# Patient Record
Sex: MALE | Race: WHITE | URBAN - METROPOLITAN AREA
[De-identification: names, ages, dates, MRNs, and addresses within clinical notes are randomized per-mention and may not be internally consistent; named-entity substitution may affect disease eponyms.]

---

## 2017-08-17 ENCOUNTER — APPOINTMENT (OUTPATIENT)
Dept: PHYSICAL THERAPY | Facility: CLINIC | Age: 67
End: 2017-08-17
Payer: COMMERCIAL

## 2017-08-17 PROCEDURE — 97161 PT EVAL LOW COMPLEX 20 MIN: CPT

## 2017-08-17 PROCEDURE — 97140 MANUAL THERAPY 1/> REGIONS: CPT

## 2017-08-21 ENCOUNTER — APPOINTMENT (OUTPATIENT)
Dept: PHYSICAL THERAPY | Facility: CLINIC | Age: 67
End: 2017-08-21
Payer: COMMERCIAL

## 2017-08-21 PROCEDURE — 97140 MANUAL THERAPY 1/> REGIONS: CPT

## 2017-08-21 PROCEDURE — 97010 HOT OR COLD PACKS THERAPY: CPT

## 2017-08-21 PROCEDURE — 97110 THERAPEUTIC EXERCISES: CPT

## 2017-08-22 ENCOUNTER — APPOINTMENT (OUTPATIENT)
Dept: PHYSICAL THERAPY | Facility: CLINIC | Age: 67
End: 2017-08-22
Payer: COMMERCIAL

## 2017-08-22 PROCEDURE — 97110 THERAPEUTIC EXERCISES: CPT

## 2017-08-22 PROCEDURE — 97140 MANUAL THERAPY 1/> REGIONS: CPT

## 2017-08-24 ENCOUNTER — APPOINTMENT (OUTPATIENT)
Dept: PHYSICAL THERAPY | Facility: CLINIC | Age: 67
End: 2017-08-24
Payer: COMMERCIAL

## 2017-08-24 PROCEDURE — 97010 HOT OR COLD PACKS THERAPY: CPT

## 2017-08-24 PROCEDURE — 97140 MANUAL THERAPY 1/> REGIONS: CPT

## 2017-08-24 PROCEDURE — 97110 THERAPEUTIC EXERCISES: CPT

## 2017-08-28 ENCOUNTER — APPOINTMENT (OUTPATIENT)
Dept: PHYSICAL THERAPY | Facility: CLINIC | Age: 67
End: 2017-08-28
Payer: COMMERCIAL

## 2017-08-28 PROCEDURE — 97110 THERAPEUTIC EXERCISES: CPT

## 2017-08-28 PROCEDURE — 97010 HOT OR COLD PACKS THERAPY: CPT

## 2017-08-28 PROCEDURE — 97140 MANUAL THERAPY 1/> REGIONS: CPT

## 2017-08-29 ENCOUNTER — APPOINTMENT (OUTPATIENT)
Dept: PHYSICAL THERAPY | Facility: CLINIC | Age: 67
End: 2017-08-29
Payer: COMMERCIAL

## 2017-08-29 PROCEDURE — 97110 THERAPEUTIC EXERCISES: CPT

## 2017-08-29 PROCEDURE — 97140 MANUAL THERAPY 1/> REGIONS: CPT

## 2017-08-29 PROCEDURE — 97010 HOT OR COLD PACKS THERAPY: CPT

## 2017-08-31 ENCOUNTER — APPOINTMENT (OUTPATIENT)
Dept: PHYSICAL THERAPY | Facility: CLINIC | Age: 67
End: 2017-08-31
Payer: COMMERCIAL

## 2017-08-31 PROCEDURE — 97140 MANUAL THERAPY 1/> REGIONS: CPT

## 2017-08-31 PROCEDURE — 97110 THERAPEUTIC EXERCISES: CPT

## 2017-09-07 ENCOUNTER — APPOINTMENT (OUTPATIENT)
Dept: PHYSICAL THERAPY | Facility: CLINIC | Age: 67
End: 2017-09-07
Payer: COMMERCIAL

## 2017-09-07 PROCEDURE — 97110 THERAPEUTIC EXERCISES: CPT

## 2017-09-07 PROCEDURE — 97140 MANUAL THERAPY 1/> REGIONS: CPT

## 2017-09-11 ENCOUNTER — APPOINTMENT (OUTPATIENT)
Dept: PHYSICAL THERAPY | Facility: CLINIC | Age: 67
End: 2017-09-11
Payer: COMMERCIAL

## 2017-09-11 PROCEDURE — 97110 THERAPEUTIC EXERCISES: CPT

## 2017-09-11 PROCEDURE — 97140 MANUAL THERAPY 1/> REGIONS: CPT

## 2017-09-14 ENCOUNTER — APPOINTMENT (OUTPATIENT)
Dept: PHYSICAL THERAPY | Facility: CLINIC | Age: 67
End: 2017-09-14
Payer: COMMERCIAL

## 2017-09-14 PROCEDURE — 97140 MANUAL THERAPY 1/> REGIONS: CPT

## 2017-09-14 PROCEDURE — 97110 THERAPEUTIC EXERCISES: CPT

## 2017-09-18 ENCOUNTER — APPOINTMENT (OUTPATIENT)
Dept: PHYSICAL THERAPY | Facility: CLINIC | Age: 67
End: 2017-09-18
Payer: COMMERCIAL

## 2017-09-18 PROCEDURE — 97110 THERAPEUTIC EXERCISES: CPT

## 2017-09-18 PROCEDURE — 97140 MANUAL THERAPY 1/> REGIONS: CPT

## 2017-09-21 ENCOUNTER — APPOINTMENT (OUTPATIENT)
Dept: PHYSICAL THERAPY | Facility: CLINIC | Age: 67
End: 2017-09-21
Payer: COMMERCIAL

## 2017-09-21 PROCEDURE — 97110 THERAPEUTIC EXERCISES: CPT

## 2017-09-21 PROCEDURE — 97140 MANUAL THERAPY 1/> REGIONS: CPT

## 2017-09-25 ENCOUNTER — APPOINTMENT (OUTPATIENT)
Dept: PHYSICAL THERAPY | Facility: CLINIC | Age: 67
End: 2017-09-25
Payer: COMMERCIAL

## 2017-09-25 PROCEDURE — 97110 THERAPEUTIC EXERCISES: CPT

## 2017-09-25 PROCEDURE — 97140 MANUAL THERAPY 1/> REGIONS: CPT

## 2017-09-28 ENCOUNTER — APPOINTMENT (OUTPATIENT)
Dept: PHYSICAL THERAPY | Facility: CLINIC | Age: 67
End: 2017-09-28
Payer: COMMERCIAL

## 2017-09-28 PROCEDURE — 97140 MANUAL THERAPY 1/> REGIONS: CPT

## 2017-09-28 PROCEDURE — 97110 THERAPEUTIC EXERCISES: CPT

## 2017-09-28 PROCEDURE — G8985 CARRY GOAL STATUS: HCPCS

## 2017-09-28 PROCEDURE — G8984 CARRY CURRENT STATUS: HCPCS

## 2017-09-28 PROCEDURE — 97164 PT RE-EVAL EST PLAN CARE: CPT

## 2017-11-14 ENCOUNTER — APPOINTMENT (OUTPATIENT)
Dept: PHYSICAL THERAPY | Facility: CLINIC | Age: 67
End: 2017-11-14
Payer: COMMERCIAL

## 2017-11-16 ENCOUNTER — APPOINTMENT (OUTPATIENT)
Dept: PHYSICAL THERAPY | Facility: CLINIC | Age: 67
End: 2017-11-16
Payer: COMMERCIAL

## 2017-11-16 PROCEDURE — 97140 MANUAL THERAPY 1/> REGIONS: CPT

## 2017-11-16 PROCEDURE — 97161 PT EVAL LOW COMPLEX 20 MIN: CPT

## 2017-11-16 PROCEDURE — G8990 OTHER PT/OT CURRENT STATUS: HCPCS

## 2017-11-16 PROCEDURE — G8991 OTHER PT/OT GOAL STATUS: HCPCS

## 2017-11-16 PROCEDURE — 97112 NEUROMUSCULAR REEDUCATION: CPT

## 2017-11-22 ENCOUNTER — APPOINTMENT (OUTPATIENT)
Dept: PHYSICAL THERAPY | Facility: CLINIC | Age: 67
End: 2017-11-22
Payer: COMMERCIAL

## 2017-11-22 PROCEDURE — 97112 NEUROMUSCULAR REEDUCATION: CPT

## 2017-11-22 PROCEDURE — 97140 MANUAL THERAPY 1/> REGIONS: CPT

## 2017-11-27 ENCOUNTER — APPOINTMENT (OUTPATIENT)
Dept: PHYSICAL THERAPY | Facility: CLINIC | Age: 67
End: 2017-11-27
Payer: COMMERCIAL

## 2017-12-11 ENCOUNTER — APPOINTMENT (OUTPATIENT)
Dept: PHYSICAL THERAPY | Facility: CLINIC | Age: 67
End: 2017-12-11
Payer: COMMERCIAL

## 2017-12-11 PROCEDURE — 97110 THERAPEUTIC EXERCISES: CPT

## 2017-12-11 PROCEDURE — 97140 MANUAL THERAPY 1/> REGIONS: CPT

## 2017-12-18 ENCOUNTER — APPOINTMENT (OUTPATIENT)
Dept: PHYSICAL THERAPY | Facility: CLINIC | Age: 67
End: 2017-12-18
Payer: COMMERCIAL

## 2017-12-18 PROCEDURE — 97110 THERAPEUTIC EXERCISES: CPT

## 2017-12-18 PROCEDURE — 97140 MANUAL THERAPY 1/> REGIONS: CPT

## 2017-12-26 ENCOUNTER — APPOINTMENT (OUTPATIENT)
Dept: PHYSICAL THERAPY | Facility: CLINIC | Age: 67
End: 2017-12-26
Payer: COMMERCIAL

## 2017-12-28 ENCOUNTER — APPOINTMENT (OUTPATIENT)
Dept: PHYSICAL THERAPY | Facility: CLINIC | Age: 67
End: 2017-12-28
Payer: COMMERCIAL

## 2018-01-08 ENCOUNTER — APPOINTMENT (OUTPATIENT)
Dept: PHYSICAL THERAPY | Facility: CLINIC | Age: 68
End: 2018-01-08
Payer: COMMERCIAL

## 2018-01-08 PROCEDURE — G8991 OTHER PT/OT GOAL STATUS: HCPCS

## 2018-01-08 PROCEDURE — G8990 OTHER PT/OT CURRENT STATUS: HCPCS

## 2018-01-08 PROCEDURE — 97110 THERAPEUTIC EXERCISES: CPT

## 2018-01-08 PROCEDURE — 97164 PT RE-EVAL EST PLAN CARE: CPT

## 2018-01-08 PROCEDURE — 97140 MANUAL THERAPY 1/> REGIONS: CPT

## 2018-01-15 ENCOUNTER — APPOINTMENT (OUTPATIENT)
Dept: PHYSICAL THERAPY | Facility: CLINIC | Age: 68
End: 2018-01-15
Payer: COMMERCIAL

## 2018-01-15 PROCEDURE — 97140 MANUAL THERAPY 1/> REGIONS: CPT

## 2018-01-15 PROCEDURE — 97110 THERAPEUTIC EXERCISES: CPT

## 2018-01-22 ENCOUNTER — APPOINTMENT (OUTPATIENT)
Dept: PHYSICAL THERAPY | Facility: CLINIC | Age: 68
End: 2018-01-22
Payer: COMMERCIAL

## 2018-01-22 PROCEDURE — 97112 NEUROMUSCULAR REEDUCATION: CPT

## 2018-01-22 PROCEDURE — 97014 ELECTRIC STIMULATION THERAPY: CPT

## 2018-01-22 PROCEDURE — 97140 MANUAL THERAPY 1/> REGIONS: CPT

## 2018-01-25 ENCOUNTER — OFFICE VISIT (OUTPATIENT)
Dept: PHYSICAL THERAPY | Facility: CLINIC | Age: 68
End: 2018-01-25
Payer: COMMERCIAL

## 2018-01-25 DIAGNOSIS — G89.18 ACUTE POST-OPERATIVE PAIN: ICD-10-CM

## 2018-01-25 DIAGNOSIS — M25.511 ACUTE PAIN OF RIGHT SHOULDER: Primary | ICD-10-CM

## 2018-01-25 DIAGNOSIS — Z96.611 STATUS POST REVERSE TOTAL REPLACEMENT OF RIGHT SHOULDER: ICD-10-CM

## 2018-01-25 PROCEDURE — 97140 MANUAL THERAPY 1/> REGIONS: CPT | Performed by: PHYSICAL THERAPIST

## 2018-01-25 PROCEDURE — 97110 THERAPEUTIC EXERCISES: CPT | Performed by: PHYSICAL THERAPIST

## 2018-01-25 NOTE — PROGRESS NOTES
Daily Note     Today's date: 2018  Patient name: Mendoza Og  : 1950  MRN: [id number]  Referring provider: Sobeida Singleton MD  Dx:   Encounter Diagnoses   Name Primary?  Acute pain of right shoulder Yes    Acute post-operative pain     Status post reverse total replacement of right shoulder                   Subjective: Pt notes that his shoulder felt very good today      Objective: Pt able to attain 145 degrees passively post manuals and exercise        Assessment: Pt continues to need Soft tissue work to improve overhead motion, but was able to start the day at 130 degrees passively, compared to last visit at 120 degrees passively    Plan: Continue per plan of care       Precautions: Right reverse TSA    Daily Treatment Diary     Manual  2018              Right subscap and infraspinatus TPR 10'            Ant, post, inf mobs Grade 1-2            Shoulder flexion stretch 3'                                          Exercise Diary              Alice's 3'            Supine SA  3x10            S/l Er 2x10            Supine flexion 3x10            Supine H abd 2x10                                                                                                                                                                                                                   Modalities

## 2018-01-30 ENCOUNTER — OFFICE VISIT (OUTPATIENT)
Dept: PHYSICAL THERAPY | Facility: CLINIC | Age: 68
End: 2018-01-30
Payer: COMMERCIAL

## 2018-01-30 DIAGNOSIS — Z96.611 STATUS POST REVERSE TOTAL REPLACEMENT OF RIGHT SHOULDER: ICD-10-CM

## 2018-01-30 DIAGNOSIS — M25.511 ACUTE PAIN OF RIGHT SHOULDER: Primary | ICD-10-CM

## 2018-01-30 DIAGNOSIS — G89.18 ACUTE POST-OPERATIVE PAIN: ICD-10-CM

## 2018-01-30 PROCEDURE — 97110 THERAPEUTIC EXERCISES: CPT

## 2018-01-30 PROCEDURE — 97140 MANUAL THERAPY 1/> REGIONS: CPT

## 2018-01-30 NOTE — PROGRESS NOTES
Daily Note     Today's date: 2018  Patient name: Tami Romeo  : 1950  MRN: [id number]  Referring provider: Raleigh Kang MD  Dx:   Encounter Diagnoses   Name Primary?  Acute pain of right shoulder Yes    Acute post-operative pain     Status post reverse total replacement of right shoulder                   Subjective: notes that last 2 days he felt like he was having good day and able to move arm more  Objective: See treatment diary below  Precautions: Right reverse TSA     Daily Treatment Diary      Manual  2018                   Right subscap and infraspinatus TPR 10'  5 min                   Ant, post, inf mobs Grade 1-2                     Shoulder flexion stretch 3'  5min                   Shoulder abd in s/l   5min                                                 Exercise Diary                        Pulley's 3'  3'                   Supine SA  3x10  3x10                   S/l Er 2x10  3x10                   Supine flexion 3x10  3x10                   Supine H abd 2x10  nv                                                                                                                                                                                                                                                                                                                                                                                                 Modalities                                                                                                   Assessment: Tolerated treatment with stiffness with passive motion  Patient demonstrated fatigue post treatment  Pt with PROM flexion 148* with manual scap stabilization      Plan: Continue per plan of care     He sees doctor

## 2018-02-01 ENCOUNTER — OFFICE VISIT (OUTPATIENT)
Dept: PHYSICAL THERAPY | Facility: CLINIC | Age: 68
End: 2018-02-01
Payer: COMMERCIAL

## 2018-02-01 DIAGNOSIS — G89.18 ACUTE POST-OPERATIVE PAIN: ICD-10-CM

## 2018-02-01 DIAGNOSIS — Z96.611 STATUS POST REVERSE TOTAL REPLACEMENT OF RIGHT SHOULDER: ICD-10-CM

## 2018-02-01 DIAGNOSIS — M25.511 ACUTE PAIN OF RIGHT SHOULDER: Primary | ICD-10-CM

## 2018-02-01 PROCEDURE — 97110 THERAPEUTIC EXERCISES: CPT

## 2018-02-01 PROCEDURE — 97140 MANUAL THERAPY 1/> REGIONS: CPT

## 2018-02-01 NOTE — PROGRESS NOTES
Daily Note     Today's date: 2018  Patient name: Kaelyn Whiteside  : 1950  MRN: [id number]  Referring provider: William Castillo MD  Dx:   Encounter Diagnoses   Name Primary?  Acute pain of right shoulder Yes    Acute post-operative pain     Status post reverse total replacement of right shoulder                   Subjective: feels achy      Objective: See treatment diary below  Daily Treatment Diary      Manual  2018                 Right subscap and infraspinatus TPR 10'  5 min  2 min                 Ant, post, inf mobs Grade 1-2                     Shoulder flexion stretch 3'  5min  5 min                 Shoulder abd in s/l   5min  5min                 Contract/relax ER and IR2      7min                       Exercise Diary                        Pulley's 3'  3'  3'                 Supine SA  3x10  3x10  3x10                 S/l Er 2x10  3x10  3x10                 Supine flexion 3x10  3x10  3x10                 Supine H abd 2x10  nv                    s/l abd     3x10                  posterior cap stretch     20"x5                                                                                                                                                                                                                                                                                                                                               Modalities                                                                                                  Assessment:  pt  Gained about 21* of ER with contract  relax technique with less pain through motion  IR very tight with not much improvement noted today but will cont to address with addition of posterior cap stretch passively and actively      Plan: Progress note during next visit     Sees ortho next Wednesday

## 2018-02-06 ENCOUNTER — APPOINTMENT (OUTPATIENT)
Dept: PHYSICAL THERAPY | Facility: CLINIC | Age: 68
End: 2018-02-06
Payer: COMMERCIAL

## 2018-02-08 ENCOUNTER — OFFICE VISIT (OUTPATIENT)
Dept: PHYSICAL THERAPY | Facility: CLINIC | Age: 68
End: 2018-02-08
Payer: COMMERCIAL

## 2018-02-08 DIAGNOSIS — G89.18 ACUTE POST-OPERATIVE PAIN: ICD-10-CM

## 2018-02-08 DIAGNOSIS — M25.511 ACUTE PAIN OF RIGHT SHOULDER: Primary | ICD-10-CM

## 2018-02-08 DIAGNOSIS — Z96.611 STATUS POST REVERSE TOTAL REPLACEMENT OF RIGHT SHOULDER: ICD-10-CM

## 2018-02-08 PROCEDURE — 97110 THERAPEUTIC EXERCISES: CPT

## 2018-02-08 PROCEDURE — 97140 MANUAL THERAPY 1/> REGIONS: CPT

## 2018-02-08 NOTE — PROGRESS NOTES
Daily Note     Today's date: 2018  Patient name: Angella Arenas  : 1950  MRN: [id number]  Referring provider: Maggie Bernal MD  Dx:   Encounter Diagnoses   Name Primary?  Acute pain of right shoulder Yes    Acute post-operative pain     Status post reverse total replacement of right shoulder                   Subjective: notes that the doctor was pleased with motion and that he is able to strengthening in therapy only but not on own at this time      Objective: See treatment diary below  Daily Treatment Diary      Manual  2018               Right subscap and infraspinatus TPR 10'  5 min  2 min                 Ant, post, inf mobs Grade 1-2                     Shoulder flexion stretch 3'  5min  5 min                 Shoulder abd in s/l   5min  5min                 Contract/relax ER and IR2      7min                       Exercise Diary                        Pulley's 3'  3'  3'  3'               Supine SA  3x10  3x10  3x10  3x10               S/l Er 2x10  3x10  3x10  3x10               Supine flexion 3x10  3x10  3x10  3x10               Supine H abd 2x10  nv    3x10                s/l abd     3x10  3x10                posterior cap stretch     20"x5  20"x5                wand flexion       10"x10                wand scaption       10"x10                                                                                                                                                                                                                                                                                             Modalities                                                                                                  Assessment: Tolerated treatment with more pain in shoulder "stabbing" that he was not having last week during manual stretching   Patient demonstrated fatigue post treatment      Plan: Progress note during next visit

## 2018-02-13 ENCOUNTER — APPOINTMENT (OUTPATIENT)
Dept: PHYSICAL THERAPY | Facility: CLINIC | Age: 68
End: 2018-02-13
Payer: COMMERCIAL

## 2018-02-15 ENCOUNTER — EVALUATION (OUTPATIENT)
Dept: PHYSICAL THERAPY | Facility: CLINIC | Age: 68
End: 2018-02-15
Payer: COMMERCIAL

## 2018-02-15 DIAGNOSIS — M25.511 ACUTE PAIN OF RIGHT SHOULDER: Primary | ICD-10-CM

## 2018-02-15 DIAGNOSIS — G89.18 ACUTE POST-OPERATIVE PAIN: ICD-10-CM

## 2018-02-15 DIAGNOSIS — Z96.611 STATUS POST REVERSE TOTAL REPLACEMENT OF RIGHT SHOULDER: ICD-10-CM

## 2018-02-15 PROCEDURE — 97112 NEUROMUSCULAR REEDUCATION: CPT | Performed by: PHYSICAL THERAPIST

## 2018-02-15 PROCEDURE — 97140 MANUAL THERAPY 1/> REGIONS: CPT | Performed by: PHYSICAL THERAPIST

## 2018-02-15 PROCEDURE — 97110 THERAPEUTIC EXERCISES: CPT | Performed by: PHYSICAL THERAPIST

## 2018-02-15 NOTE — LETTER
2018    Annalisa Canas MD   [address]    Patient: Reji Stafford   YOB: 1950   Date of Visit: 2/15/2018     Encounter Diagnosis     ICD-10-CM    1  Acute pain of right shoulder M25 511    2  Acute post-operative pain G89 18    3  Status post reverse total replacement of right shoulder [id number] [id number]        Dear Dr Jori Paiz:    Please review the attached Plan of Care from [facility] recent visit  Please verify that you agree therapy should continue by signing the attached document and sending it back to our office  If you have any questions or concerns, please don't hesitate to call  Sincerely,    Marilyn Damico PT      Referring Provider:      I certify that I have read the below Plan of Care and certify the need for these services furnished under this plan of treatment while under my care  Annalisa Canas MD  [address] [facility] [address]: [phone]          PT Re-Evaluation     Today's date: 2/15/2018  Patient name: Reji Stafford  : 1950  MRN: [id number]  Referring provider: Kathy Woodall MD  Dx:   Encounter Diagnoses   Name Primary?  Acute pain of right shoulder Yes    Acute post-operative pain     Status post reverse total replacement of right shoulder                   Assessment  Impairments: abnormal or restricted ROM, activity intolerance, impaired physical strength and scapular dyskinesis    Assessment details: ptis a 75 yo male that presents with decreasing pain, increasing strength, and increasing rom  Pt continues to have setbacks between PT sessions, and consistently needs stretching to regain the motion lost between sessions   Pt would benefit from continued skilled PT to return him to a more functional pre-morbid condition  Understanding of Dx/Px/POC: good   Prognosis: good    Goals  Pt will have full arom in 4 weeeks  Pt will have full prom in 3 weeks  Pt will have full strength in 8 weeks    Plan  Planned therapy interventions: neuromuscular re-education, strengthening, therapeutic activities and therapeutic exercise  Frequency: 2x week  Duration in weeks: 8  Treatment plan discussed with: patient        Subjective Evaluation    History of Present Illness  Date of surgery: 2017    Mechanism of injury: surgery  Mechanism of injury: Pt notes that overall his pain levels have been low  He notes that they have been coming down  He notes that he went to the surgeon two weeks ago and the surgeon was happy with the progress  HE notes that since that point he has taken a few steps back, and is stiff again  He notes that he does his stretches daily  Pain  Current pain ratin  At best pain ratin  At worst pain rating: 3  Quality: dull ache  Progression: improved          Objective     Postural Observations  Seated posture: good  Standing posture: good        Palpation     Right Tenderness of the anterior deltoid, infraspinatus, subscapularis, teres major and teres minor  Trigger point to infraspinatus, subscapularis, teres major and teres minor       Active Range of Motion     Right Shoulder   Flexion: 110 (140 post stretching) degrees   Abduction: 90 degrees   External rotation 0°:  25 degrees   Internal rotation 0°:  WFL    Passive Range of Motion     Right Shoulder   Flexion: 145 degrees   Abduction: 115 degrees   External rotation 45°:  55 degrees   Internal rotation 45°:  25 degrees     Strength/Myotome Testing     Right Shoulder     Planes of Motion   Flexion: 3+   Extension: 4-   Abduction: 3+   Adduction: 4   External rotation at 0°:  4   Internal rotation at 0°:  3+     Isolated Muscles   Anterior deltoid: 4-   Lower trapezius: 3+   Serratus anterior: 4-       Precautions: DOS 17    Daily Treatment Diary     Manual                                                                                   Exercise Diary Modalities

## 2018-02-15 NOTE — PROGRESS NOTES
PT Re-Evaluation     Today's date: 2/15/2018  Patient name: Dorothy Calles  : 1950  MRN: [id number]  Referring provider: Rosa Segundo MD  Dx:   Encounter Diagnoses   Name Primary?  Acute pain of right shoulder Yes    Acute post-operative pain     Status post reverse total replacement of right shoulder                   Assessment  Impairments: abnormal or restricted ROM, activity intolerance, impaired physical strength and scapular dyskinesis    Assessment details: ptis a 73 yo male that presents with decreasing pain, increasing strength, and increasing rom  Pt continues to have setbacks between PT sessions, and consistently needs stretching to regain the motion lost between sessions  Pt would benefit from continued skilled PT to return him to a more functional pre-morbid condition  Understanding of Dx/Px/POC: good   Prognosis: good    Goals  Pt will have full arom in 4 weeeks  Pt will have full prom in 3 weeks  Pt will have full strength in 8 weeks    Plan  Planned therapy interventions: neuromuscular re-education, strengthening, therapeutic activities and therapeutic exercise  Frequency: 2x week  Duration in weeks: 8  Treatment plan discussed with: patient        Subjective Evaluation    History of Present Illness  Date of surgery: 2017    Mechanism of injury: surgery  Mechanism of injury: Pt notes that overall his pain levels have been low  He notes that they have been coming down  He notes that he went to the surgeon two weeks ago and the surgeon was happy with the progress  HE notes that since that point he has taken a few steps back, and is stiff again  He notes that he does his stretches daily     Pain  Current pain ratin  At best pain ratin  At worst pain rating: 3  Quality: dull ache  Progression: improved          Objective     Postural Observations  Seated posture: good  Standing posture: good        Palpation     Right Tenderness of the anterior deltoid, infraspinatus, subscapularis, teres major and teres minor  Trigger point to infraspinatus, subscapularis, teres major and teres minor       Active Range of Motion     Right Shoulder   Flexion: 110 (140 post stretching) degrees   Abduction: 90 degrees   External rotation 0°: 25 degrees   Internal rotation 0°: WFL    Passive Range of Motion     Right Shoulder   Flexion: 145 degrees   Abduction: 115 degrees   External rotation 45°: 55 degrees   Internal rotation 45°: 25 degrees     Strength/Myotome Testing     Right Shoulder     Planes of Motion   Flexion: 3+   Extension: 4-   Abduction: 3+   Adduction: 4   External rotation at 0°: 4   Internal rotation at 0°: 3+     Isolated Muscles   Anterior deltoid: 4-   Lower trapezius: 3+   Serratus anterior: 4-       Precautions: DOS 7/24/17    Daily Treatment Diary     Manual                                                                                   Exercise Diary                                                                                                                                                                                                                                                                                      Modalities

## 2018-02-19 ENCOUNTER — TRANSCRIBE ORDERS (OUTPATIENT)
Dept: PHYSICAL THERAPY | Facility: CLINIC | Age: 68
End: 2018-02-19

## 2018-02-19 DIAGNOSIS — G89.29 CHRONIC RIGHT SHOULDER PAIN: Primary | ICD-10-CM

## 2018-02-19 DIAGNOSIS — M25.511 CHRONIC RIGHT SHOULDER PAIN: Primary | ICD-10-CM

## 2018-02-20 ENCOUNTER — OFFICE VISIT (OUTPATIENT)
Dept: PHYSICAL THERAPY | Facility: CLINIC | Age: 68
End: 2018-02-20
Payer: COMMERCIAL

## 2018-02-20 DIAGNOSIS — M25.511 ACUTE PAIN OF RIGHT SHOULDER: Primary | ICD-10-CM

## 2018-02-20 DIAGNOSIS — G89.18 ACUTE POST-OPERATIVE PAIN: ICD-10-CM

## 2018-02-20 DIAGNOSIS — Z96.611 STATUS POST REVERSE TOTAL REPLACEMENT OF RIGHT SHOULDER: ICD-10-CM

## 2018-02-20 PROCEDURE — 97140 MANUAL THERAPY 1/> REGIONS: CPT

## 2018-02-20 PROCEDURE — 97110 THERAPEUTIC EXERCISES: CPT

## 2018-02-20 NOTE — PROGRESS NOTES
Daily Note     Today's date: 2018  Patient name: Angella Areans  : 1950  MRN: [id number]  Referring provider: Maggie Bernal MD  Dx:   Encounter Diagnosis     ICD-10-CM    1  Acute pain of right shoulder M25 511    2  Acute post-operative pain G89 18    3  Status post reverse total replacement of right shoulder [id number] [id number]                   Subjective: a little sore      Objective: See treatment diary below  aily Treatment Diary      Manual  2018             Right subscap and infraspinatus TPR 10'  5 min  2 min    3min             Ant, post, inf mobs Grade 1-2                     Shoulder flexion stretch 3'  5min  5 min    PROM all  15'             Shoulder abd in s/l   5min  5min                 Contract/relax ER and IR2      7min    5 min                    Exercise Diary                        Pulley's 3'  3'  3'  3'  3'             Supine SA  3x10  3x10  3x10  3x10  2#  2x10             S/l Er 2x10  3x10  3x10  3x10  3x10             Supine flexion 3x10  3x10  3x10  3x10  2#  2x10             Supine H abd 2x10  nv    3x10  3x10              s/l abd     3x10  3x10  3x10              posterior cap stretch     20"x5  20"x5  passive              wand flexion       10"x10  10"x10              wand scaption       10"x10  10x10                                                                                                                                                                                                                                                                                           Modalities                                                                                                    Assessment:   Fatigues quickly with exercises  Increased ER motion after posterior cap stretch, more pain with contract relax for ER  Plan: Continue per plan of care

## 2018-02-22 ENCOUNTER — APPOINTMENT (OUTPATIENT)
Dept: PHYSICAL THERAPY | Facility: CLINIC | Age: 68
End: 2018-02-22
Payer: COMMERCIAL

## 2018-02-27 ENCOUNTER — OFFICE VISIT (OUTPATIENT)
Dept: PHYSICAL THERAPY | Facility: CLINIC | Age: 68
End: 2018-02-27
Payer: COMMERCIAL

## 2018-02-27 DIAGNOSIS — Z96.611 STATUS POST REVERSE TOTAL REPLACEMENT OF RIGHT SHOULDER: ICD-10-CM

## 2018-02-27 DIAGNOSIS — M25.511 ACUTE PAIN OF RIGHT SHOULDER: Primary | ICD-10-CM

## 2018-02-27 DIAGNOSIS — G89.18 ACUTE POST-OPERATIVE PAIN: ICD-10-CM

## 2018-02-27 PROCEDURE — 97014 ELECTRIC STIMULATION THERAPY: CPT

## 2018-02-27 PROCEDURE — 97140 MANUAL THERAPY 1/> REGIONS: CPT

## 2018-02-27 NOTE — PROGRESS NOTES
Daily Note     Today's date: 2018  Patient name: Betsy Fine  : 1950  MRN: [id number]  Referring provider: Moses Ramires MD  Dx:   Encounter Diagnosis     ICD-10-CM    1  Acute pain of right shoulder M25 511    2  Acute post-operative pain G89 18    3  Status post reverse total replacement of right shoulder [id number] [id number]                   Subjective: pt states that he has had a toothache like pain in arm since Friday and only thing that he did was wash car and he was not even able to roll up hose      Objective: See treatment diary below  Manual  2018           Right subscap and infraspinatus TPR 10'  5 min  2 min    3min  5min           Ant, post, inf mobs Grade 1-2                     Shoulder flexion stretch 3'  5min  5 min    PROM all  15'  10min           Shoulder abd in s/l   5min  5min                 Contract/relax ER and IR2      7min    5 min                    Exercise Diary                        Pulley's 3'  3'  3'  3'  3'  3'           Supine SA  3x10  3x10  3x10  3x10  2#  2x10             S/l Er 2x10  3x10  3x10  3x10  3x10             Supine flexion 3x10  3x10  3x10  3x10  2#  2x10             Supine H abd 2x10  nv    3x10  3x10              s/l abd     3x10  3x10  3x10              posterior cap stretch     20"x5  20"x5  passive              wand flexion       10"x10  10"x10              wand scaption       10"x10  10x10                                                                                                                                                                                                                                                                                           Modalities                         TENS         10'                                                                     Assessment:    Patient with decreased ability to perform exercises or tolerate PROM as pain in shoulder is sharp with motion and decreased motion noted passively  Plan: Progress treatment as tolerated

## 2018-11-13 ENCOUNTER — HOSPITAL ENCOUNTER (OUTPATIENT)
Dept: CARDIOLOGY | Facility: HOSPITAL | Age: 68
Discharge: HOME | End: 2018-11-13
Attending: ORTHOPAEDIC SURGERY
Payer: COMMERCIAL

## 2018-11-13 ENCOUNTER — APPOINTMENT (OUTPATIENT)
Dept: PREADMISSION TESTING | Facility: HOSPITAL | Age: 68
End: 2018-11-13
Attending: ORTHOPAEDIC SURGERY
Payer: COMMERCIAL

## 2018-11-13 ENCOUNTER — HOSPITAL ENCOUNTER (OUTPATIENT)
Facility: HOSPITAL | Age: 68
Setting detail: SURGERY ADMIT
End: 2018-11-13
Attending: ORTHOPAEDIC SURGERY | Admitting: ORTHOPAEDIC SURGERY
Payer: COMMERCIAL

## 2018-11-13 ENCOUNTER — TRANSCRIBE ORDERS (OUTPATIENT)
Dept: CARDIOLOGY | Facility: HOSPITAL | Age: 68
End: 2018-11-13

## 2018-11-13 VITALS
OXYGEN SATURATION: 99 % | WEIGHT: 181.2 LBS | DIASTOLIC BLOOD PRESSURE: 64 MMHG | HEART RATE: 65 BPM | SYSTOLIC BLOOD PRESSURE: 108 MMHG | HEIGHT: 69 IN | RESPIRATION RATE: 16 BRPM | BODY MASS INDEX: 26.84 KG/M2 | TEMPERATURE: 97.3 F

## 2018-11-13 DIAGNOSIS — T84.84XA PAIN DUE TO INTERNAL ORTHOPEDIC PROSTHETIC DEVICES, IMPLANTS AND GRAFTS, INITIAL ENCOUNTER (CMS/HCC): ICD-10-CM

## 2018-11-13 DIAGNOSIS — T84.84XA PAIN DUE TO INTERNAL ORTHOPEDIC PROSTHETIC DEVICES, IMPLANTS AND GRAFTS, INITIAL ENCOUNTER (CMS/HCC): Primary | ICD-10-CM

## 2018-11-13 DIAGNOSIS — M75.21 BICIPITAL TENDINITIS OF RIGHT SHOULDER: ICD-10-CM

## 2018-11-13 DIAGNOSIS — M1A.09X0 CHRONIC GOUT OF MULTIPLE SITES, UNSPECIFIED CAUSE: ICD-10-CM

## 2018-11-13 DIAGNOSIS — N18.4 CKD (CHRONIC KIDNEY DISEASE), STAGE IV (CMS/HCC): ICD-10-CM

## 2018-11-13 DIAGNOSIS — I10 ESSENTIAL HYPERTENSION: ICD-10-CM

## 2018-11-13 DIAGNOSIS — Z01.818 PRE-PROCEDURAL EXAMINATION: Primary | ICD-10-CM

## 2018-11-13 LAB
ANION GAP SERPL CALC-SCNC: 7 MEQ/L (ref 3–15)
ATRIAL RATE: 65
BUN SERPL-MCNC: 33 MG/DL (ref 8–20)
CALCIUM SERPL-MCNC: 9.7 MG/DL (ref 8.9–10.3)
CHLORIDE SERPL-SCNC: 106 MEQ/L (ref 98–109)
CO2 SERPL-SCNC: 27 MEQ/L (ref 22–32)
CREAT SERPL-MCNC: 2.7 MG/DL (ref 0.8–1.3)
ERYTHROCYTE [DISTWIDTH] IN BLOOD BY AUTOMATED COUNT: 12.3 % (ref 11.6–14.4)
GFR SERPL CREATININE-BSD FRML MDRD: 23.8 ML/MIN/1.73M*2
GLUCOSE SERPL-MCNC: 98 MG/DL (ref 70–99)
HCT VFR BLDCO AUTO: 42.8 % (ref 40.1–51)
HGB BLD-MCNC: 15 G/DL (ref 13.7–17.5)
MCH RBC QN AUTO: 31.6 PG (ref 28–33.2)
MCHC RBC AUTO-ENTMCNC: 35 G/DL (ref 32.2–36.5)
MCV RBC AUTO: 90.3 FL (ref 83–98)
P AXIS: 52
PDW BLD AUTO: 10.1 FL (ref 9.4–12.4)
PLATELET # BLD AUTO: 147 K/UL (ref 150–350)
POTASSIUM SERPL-SCNC: 4.6 MEQ/L (ref 3.6–5.1)
PR INTERVAL: 184
QRS DURATION: 92
QT INTERVAL: 378
QTC CALCULATION(BAZETT): 393
R AXIS: 43
RBC # BLD AUTO: 4.74 M/UL (ref 4.5–5.8)
SODIUM SERPL-SCNC: 140 MEQ/L (ref 136–144)
T WAVE AXIS: 59
VENTRICULAR RATE: 65
WBC # BLD AUTO: 6.26 K/UL (ref 3.8–10.5)

## 2018-11-13 PROCEDURE — 93005 ELECTROCARDIOGRAM TRACING: CPT

## 2018-11-13 PROCEDURE — 85027 COMPLETE CBC AUTOMATED: CPT

## 2018-11-13 PROCEDURE — 36415 COLL VENOUS BLD VENIPUNCTURE: CPT

## 2018-11-13 PROCEDURE — 80048 BASIC METABOLIC PNL TOTAL CA: CPT

## 2018-11-13 PROCEDURE — 99203 OFFICE O/P NEW LOW 30 MIN: CPT | Performed by: HOSPITALIST

## 2018-11-13 RX ORDER — OLMESARTAN MEDOXOMIL 20 MG/1
20 TABLET ORAL
COMMUNITY

## 2018-11-13 RX ORDER — ALLOPURINOL 100 MG/1
100 TABLET ORAL
COMMUNITY

## 2018-11-13 RX ORDER — OMEGA-3-ACID ETHYL ESTERS 1 G/1
1 CAPSULE, LIQUID FILLED ORAL 2 TIMES DAILY
COMMUNITY

## 2018-11-13 ASSESSMENT — PAIN SCALES - GENERAL: PAINLEVEL: 0-NO PAIN

## 2018-11-13 NOTE — CONSULTS
Hospital Medicine Service -  Pre-Operative Consultation         Referring Surgeon: Carlos Collins    Reason for Referral: Pre-Operative Evaluation  Surgical Procedure: Right revision to reverse versus anatomic total shoulder replacement, posterior cuff repair  Operative Date: 11/21/2018  Other Providers:      PCP: Emily Wheat MD          HISTORY OF PRESENT ILLNESS        Seun Jimenez is a 67 y.o. male presenting today to the [facility] at [facility] for pre-operative evaluation prior to planned surgery.  He had right shoulder surgery last year been having problems since then.    The patient denies any current or recent chest pain or pressure. Has never been seen by a cardiologist and has never had a stress test.    Functionally, the patient is able to ascend a flight or so of stairs with no dyspnea or chest pain.     The patient denies fevers, chills, abdominal pain, nausea, vomiting, diarrhea, Cough, dysuria, lightheadedness, dizziness, headache, weakness, tingling, numbness and seizures.    Denies personal and family history of blood clots.  Has never been tested for sleep apnea.      PAST MEDICAL AND SURGICAL HISTORY     Past Medical History:   Diagnosis Date   • Arthritis    • Chronic kidney disease     stage IV   • Elevated blood uric acid level 05/2018   • Gout     right great toe   • Hypertension    • Polycythemia 04/2017    saw hematologist     The patient was asked specifically about following and denies  CHF, Arrhythmia, CAD  CVA,TIA  GERD  Lung disease  Cancer  Prostate problems  Rash/open wounds  Liver history   Kidney disease  Anemia /bleeding problems        Past Surgical History:   Procedure Laterality Date   • KNEE ARTHROSCOPY Bilateral    • ROTATOR CUFF REPAIR      x3-right, x1-left   • TONSILLECTOMY          MEDICATIONS          Current Outpatient Prescriptions:   •  allopurinol (ZYLOPRIM) 100 mg tablet, Take 100 mg by mouth daily before  "breakfast., Disp: , Rfl:   •  olmesartan (BENICAR) 20 mg tablet, Take 20 mg by mouth daily before breakfast., Disp: , Rfl:   •  omega-3 acid ethyl esters (LOVAZA) 1 gram capsule, Take 1 g by mouth 2 (two) times a day. Last dose 11/13/18, Disp: , Rfl:     ALLERGIES        Patient has no known allergies.    FAMILY HISTORY        Family History   Problem Relation Age of Onset   • No Known Problems Mother    • Cancer Father          SOCIAL HISTORY        Social History   Substance Use Topics   • Smoking status: Never Smoker   • Smokeless tobacco: Never Used   • Alcohol use No       REVIEW OF SYSTEMS        All other systems reviewed and negative except as noted in HPI    PHYSICAL EXAMINATION        /64 (BP Location: Left upper arm, Patient Position: Sitting)   Pulse 65   Temp 36.3 °C (97.3 °F) (Temporal)   Resp 16   Ht 1.753 m (5' 9\")   Wt 82.2 kg (181 lb 3.2 oz)   SpO2 99% Comment: RA  BMI 26.76 kg/m²   Body mass index is 26.76 kg/m².  GEN: well-developed and well-nourished; not in acute distress  HEENT: normocephalic; atraumatic  NECK: no JVD; no bruits  CARDIO: regular rate and rhythm; no murmurs or rubs  RESP: clear to auscultation bilaterally; no rales, rhonchi, or wheezes  ABD: soft, non-distended, non-tender  EXT: no cyanosis, clubbing, or edema  SKIN: no rash exposed skin  MUSCULOSKELETAL: no injury or deformity  NEURO: alert and oriented x 3; nonfocal  BEHAVIOR/EMOTIONAL: appropriate; cooperative  LYMPH NODES: No cervical lymphadenopathy  LABS / EKG        Labs    Results from last 7 days  Lab Units 11/13/18  1343   WBC K/uL 6.26   HEMOGLOBIN g/dL 15.0   HEMATOCRIT % 42.8   PLATELETS K/uL 147*       Results from last 7 days  Lab Units 11/13/18  1343   SODIUM mEQ/L 140   POTASSIUM mEQ/L 4.6   CHLORIDE mEQ/L 106   CO2 mEQ/L 27   BUN mg/dL 33*   CREATININE mg/dL 2.7*   GLUCOSE mg/dL 98   CALCIUM mg/dL 9.7       ECG/Telemetry  I have independently reviewed the ECG. No significant " findings.    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    Chronic gout of multiple sites  Continue allopurinol    CKD (chronic kidney disease), stage IV (CMS/HCC) (HCC)  Stable  Baseline is 3.0  Avoid nephrotoxins postoperatively and follow creatinine level very closely       In regards to perioperative cardiac risk:  The patient does have preoperative creatinine clearance > than 2 mg/dl ,  denies any history of ischemic heart disease, denies any history of CHF, denies any history of CVA, is not on pre-operative treatment with insulin, The Revised Cardiac Risk Index (RCRI) for this patient indicates 0.9% risk.     Further comments:  Resume supplements when OK with surgical team.  I would encourage incentive spirometry to assist with minimizing kelsey-operative pulmonary risk.  DVT prophylaxis and timing of such per the discretion of the surgeon.     Please do not hesitate to contact [facility] during the upcoming hospitalization with any questions or concerns.     Jean Paul Servin MD  11/15/2018  10:40 AM

## 2018-11-13 NOTE — PRE-PROCEDURE INSTRUCTIONS
1. Admissions will call you with your arrival time on 11/20/18 (day prior to surgery) between 2pm - 4pm. For questions about your arrival time, please call [phone].    2. Please report to the 3rd Floor/OR Registration on the day of your procedure. Bring your insurance card and photo ID.    3. Please follow these fasting guidelines:   -Stop food and liquids 8 hours before your arrival time.   -You may continue to drink up to 12 oz of water,but you must stop 2 hours prior to your arrival time.   -There is nothing to drink 2 hours before your arrival time (including gum, mints, candy, and water).    4. Early on the morning of the procedure, please take the following medications listed below with a sip of water: Allopurinol      *NO aspirin, ibuprofen, anti-inflammatories, fish oil or Vitamin E unless ordered by physician.    Stop taking      5. Other instructions: Hibiclens shower x 2(no face or genital area), brush teeth    6. If you develop a cough, cold, fever, or other symptom prior to the date of the procedure, please report it to your physician immediately.    7. If you need to cancel the procedure for any reason, please contact your physician.    8. Make arrangements to have someone drive you home from the procedure. If you have not arranged for transportation home, your surgery may be cancelled.     9. You may not take public transportation or or a cab unless accompanied by a responsible person.    10. You may not drive a car or operate complex or potentially dangerous machinery for 24 hours following anesthesia and/or sedation.    11. If it is medically necessary for you to have a longer stay, you will be informed as soon as the decision is made.    12. Do not wear or bring anything of value to the hospital, including jewelry of any kind. Do not wear make-up or contact lenses. DO bring your glasses and hearing aid, with a case.    13. Dress in comfortable clothes.    14. If instructed, please bring a copy  of your Advance Directive (Living Will/Durable Power of ) on the day of your procedure.    Pre operative instructions given as per protocol.  Form explained by:

## 2018-11-15 PROBLEM — N18.4 CKD (CHRONIC KIDNEY DISEASE), STAGE IV (CMS/HCC): Status: ACTIVE | Noted: 2018-11-15

## 2018-11-15 NOTE — ASSESSMENT & PLAN NOTE
Stable  Baseline is 3.0  Avoid nephrotoxins postoperatively and follow creatinine level very closely

## 2020-02-26 ENCOUNTER — APPOINTMENT (RX ONLY)
Dept: URBAN - METROPOLITAN AREA CLINIC 374 | Facility: CLINIC | Age: 70
Setting detail: DERMATOLOGY
End: 2020-02-26

## 2020-02-26 DIAGNOSIS — L98.8 OTHER SPECIFIED DISORDERS OF THE SKIN AND SUBCUTANEOUS TISSUE: ICD-10-CM

## 2020-02-26 PROCEDURE — ? BOTOX (U OR CC)

## 2020-02-26 PROCEDURE — ? COUNSELING

## 2020-02-26 ASSESSMENT — LOCATION ZONE DERM: LOCATION ZONE: FACE

## 2020-02-26 ASSESSMENT — LOCATION SIMPLE DESCRIPTION DERM: LOCATION SIMPLE: LEFT FOREHEAD

## 2020-02-26 ASSESSMENT — LOCATION DETAILED DESCRIPTION DERM: LOCATION DETAILED: LEFT FOREHEAD

## 2020-07-15 ENCOUNTER — APPOINTMENT (RX ONLY)
Dept: URBAN - METROPOLITAN AREA CLINIC 374 | Facility: CLINIC | Age: 70
Setting detail: DERMATOLOGY
End: 2020-07-15

## 2020-07-15 DIAGNOSIS — L98.8 OTHER SPECIFIED DISORDERS OF THE SKIN AND SUBCUTANEOUS TISSUE: ICD-10-CM

## 2020-07-15 PROCEDURE — ? BOTOX (U OR CC)

## 2020-07-15 ASSESSMENT — LOCATION DETAILED DESCRIPTION DERM: LOCATION DETAILED: LEFT FOREHEAD

## 2020-07-15 ASSESSMENT — LOCATION SIMPLE DESCRIPTION DERM: LOCATION SIMPLE: LEFT FOREHEAD

## 2020-07-15 ASSESSMENT — LOCATION ZONE DERM: LOCATION ZONE: FACE

## 2020-11-11 ENCOUNTER — APPOINTMENT (RX ONLY)
Dept: URBAN - METROPOLITAN AREA CLINIC 374 | Facility: CLINIC | Age: 70
Setting detail: DERMATOLOGY
End: 2020-11-11

## 2020-11-11 DIAGNOSIS — L98.8 OTHER SPECIFIED DISORDERS OF THE SKIN AND SUBCUTANEOUS TISSUE: ICD-10-CM

## 2020-11-11 PROCEDURE — ? BOTOX (U OR CC)

## 2020-11-11 ASSESSMENT — LOCATION DETAILED DESCRIPTION DERM: LOCATION DETAILED: LEFT FOREHEAD

## 2020-11-11 ASSESSMENT — LOCATION SIMPLE DESCRIPTION DERM: LOCATION SIMPLE: LEFT FOREHEAD

## 2020-11-11 ASSESSMENT — LOCATION ZONE DERM: LOCATION ZONE: FACE

## 2021-04-07 ENCOUNTER — APPOINTMENT (RX ONLY)
Dept: URBAN - METROPOLITAN AREA CLINIC 374 | Facility: CLINIC | Age: 71
Setting detail: DERMATOLOGY
End: 2021-04-07

## 2021-04-07 DIAGNOSIS — L98.8 OTHER SPECIFIED DISORDERS OF THE SKIN AND SUBCUTANEOUS TISSUE: ICD-10-CM

## 2021-04-07 PROCEDURE — ? BOTOX (U OR CC)

## 2021-04-07 NOTE — PROCEDURE: BOTOX (U OR CC)
Additional Area 2 Units: 0
Price (Use Numbers Only, No Special Characters Or $): 240
Consent: Written consent obtained. Risks include but not limited to lid/brow ptosis, bruising, swelling, diplopia, temporary effect, incomplete chemical denervation.
Document As Units Or Cc?: units
Including Pricing Information In The Note: No
Glabellar Complex Units: 10
Dilution (U/0.1 Cc): 4
Detail Level: Detailed
Post-Care Instructions: Patient instructed to not lie down for 4 hours and limit physical activity for 24 hours.

## 2021-08-18 ENCOUNTER — APPOINTMENT (RX ONLY)
Dept: URBAN - METROPOLITAN AREA CLINIC 374 | Facility: CLINIC | Age: 71
Setting detail: DERMATOLOGY
End: 2021-08-18

## 2021-08-18 DIAGNOSIS — L98.8 OTHER SPECIFIED DISORDERS OF THE SKIN AND SUBCUTANEOUS TISSUE: ICD-10-CM

## 2021-08-18 PROCEDURE — ? BOTOX (U OR CC)

## 2022-01-19 ENCOUNTER — APPOINTMENT (RX ONLY)
Dept: URBAN - METROPOLITAN AREA CLINIC 374 | Facility: CLINIC | Age: 72
Setting detail: DERMATOLOGY
End: 2022-01-19

## 2022-01-19 DIAGNOSIS — L98.8 OTHER SPECIFIED DISORDERS OF THE SKIN AND SUBCUTANEOUS TISSUE: ICD-10-CM

## 2022-01-19 PROCEDURE — ? ADDITIONAL NOTES

## 2022-01-19 PROCEDURE — ? BOTOX (U OR CC)

## 2022-01-19 ASSESSMENT — LOCATION DETAILED DESCRIPTION DERM
LOCATION DETAILED: LEFT FOREHEAD
LOCATION DETAILED: RIGHT FOREHEAD
LOCATION DETAILED: GLABELLA
LOCATION DETAILED: RIGHT MEDIAL FOREHEAD

## 2022-01-19 ASSESSMENT — LOCATION SIMPLE DESCRIPTION DERM
LOCATION SIMPLE: GLABELLA
LOCATION SIMPLE: LEFT FOREHEAD
LOCATION SIMPLE: RIGHT FOREHEAD

## 2022-01-19 ASSESSMENT — LOCATION ZONE DERM: LOCATION ZONE: FACE

## 2022-01-19 NOTE — PROCEDURE: MIPS QUALITY
Detail Level: Detailed
Quality 226: Preventive Care And Screening: Tobacco Use: Screening And Cessation Intervention: Patient screened for tobacco use and is an ex/non-smoker
Quality 130: Documentation Of Current Medications In The Medical Record: Current Medications Documented
Quality 111:Pneumonia Vaccination Status For Older Adults: Pneumococcal Vaccination not Administered or Previously Received, Reason not Otherwise Specified
Quality 431: Preventive Care And Screening: Unhealthy Alcohol Use - Screening: Patient not identified as an unhealthy alcohol user when screened for unhealthy alcohol use using a systematic screening method
Quality 47: Advance Care Plan: Advance care planning not documented, reason not otherwise specified.

## 2022-04-27 ENCOUNTER — APPOINTMENT (RX ONLY)
Dept: URBAN - METROPOLITAN AREA CLINIC 374 | Facility: CLINIC | Age: 72
Setting detail: DERMATOLOGY
End: 2022-04-27

## 2022-04-27 DIAGNOSIS — L98.8 OTHER SPECIFIED DISORDERS OF THE SKIN AND SUBCUTANEOUS TISSUE: ICD-10-CM

## 2022-04-27 PROCEDURE — ? BOTOX (U OR CC)

## 2022-04-27 ASSESSMENT — LOCATION SIMPLE DESCRIPTION DERM
LOCATION SIMPLE: RIGHT FOREHEAD
LOCATION SIMPLE: GLABELLA
LOCATION SIMPLE: LEFT FOREHEAD

## 2022-04-27 ASSESSMENT — LOCATION ZONE DERM: LOCATION ZONE: FACE

## 2022-08-10 ENCOUNTER — APPOINTMENT (RX ONLY)
Dept: URBAN - METROPOLITAN AREA CLINIC 374 | Facility: CLINIC | Age: 72
Setting detail: DERMATOLOGY
End: 2022-08-10

## 2022-08-10 DIAGNOSIS — L98.8 OTHER SPECIFIED DISORDERS OF THE SKIN AND SUBCUTANEOUS TISSUE: ICD-10-CM

## 2022-08-10 PROCEDURE — ? BOTOX (U OR CC)

## 2022-08-10 ASSESSMENT — LOCATION DETAILED DESCRIPTION DERM
LOCATION DETAILED: LEFT FOREHEAD
LOCATION DETAILED: GLABELLA
LOCATION DETAILED: RIGHT MEDIAL FOREHEAD
LOCATION DETAILED: RIGHT FOREHEAD

## 2022-08-10 ASSESSMENT — LOCATION SIMPLE DESCRIPTION DERM
LOCATION SIMPLE: RIGHT FOREHEAD
LOCATION SIMPLE: LEFT FOREHEAD
LOCATION SIMPLE: GLABELLA

## 2022-08-10 ASSESSMENT — LOCATION ZONE DERM: LOCATION ZONE: FACE

## 2022-12-21 ENCOUNTER — APPOINTMENT (RX ONLY)
Dept: URBAN - METROPOLITAN AREA CLINIC 374 | Facility: CLINIC | Age: 72
Setting detail: DERMATOLOGY
End: 2022-12-21

## 2022-12-21 DIAGNOSIS — L98.8 OTHER SPECIFIED DISORDERS OF THE SKIN AND SUBCUTANEOUS TISSUE: ICD-10-CM

## 2022-12-21 PROCEDURE — ? BOTOX (U OR CC)

## 2022-12-21 ASSESSMENT — LOCATION DETAILED DESCRIPTION DERM
LOCATION DETAILED: GLABELLA
LOCATION DETAILED: RIGHT FOREHEAD
LOCATION DETAILED: LEFT FOREHEAD
LOCATION DETAILED: RIGHT MEDIAL FOREHEAD

## 2022-12-21 ASSESSMENT — LOCATION ZONE DERM: LOCATION ZONE: FACE

## 2022-12-21 ASSESSMENT — LOCATION SIMPLE DESCRIPTION DERM
LOCATION SIMPLE: RIGHT FOREHEAD
LOCATION SIMPLE: GLABELLA
LOCATION SIMPLE: LEFT FOREHEAD

## 2023-01-03 ENCOUNTER — APPOINTMENT (RX ONLY)
Dept: URBAN - METROPOLITAN AREA CLINIC 374 | Facility: CLINIC | Age: 73
Setting detail: DERMATOLOGY
End: 2023-01-03

## 2023-01-03 DIAGNOSIS — L98.8 OTHER SPECIFIED DISORDERS OF THE SKIN AND SUBCUTANEOUS TISSUE: ICD-10-CM

## 2023-01-03 PROCEDURE — ? PHOTO-DOCUMENTATION

## 2023-01-03 PROCEDURE — ? ADDITIONAL NOTES

## 2023-01-03 PROCEDURE — ? BOTOX (U OR CC)

## 2023-01-03 ASSESSMENT — LOCATION DETAILED DESCRIPTION DERM
LOCATION DETAILED: LEFT FOREHEAD
LOCATION DETAILED: LEFT SUPERIOR FOREHEAD
LOCATION DETAILED: RIGHT SUPERIOR MEDIAL FOREHEAD
LOCATION DETAILED: SUPERIOR MID FOREHEAD
LOCATION DETAILED: RIGHT FOREHEAD
LOCATION DETAILED: LEFT INFERIOR FOREHEAD
LOCATION DETAILED: LEFT SUPERIOR MEDIAL FOREHEAD
LOCATION DETAILED: RIGHT INFERIOR LATERAL FOREHEAD
LOCATION DETAILED: RIGHT MEDIAL FOREHEAD

## 2023-01-03 ASSESSMENT — LOCATION SIMPLE DESCRIPTION DERM
LOCATION SIMPLE: LEFT FOREHEAD
LOCATION SIMPLE: SUPERIOR FOREHEAD
LOCATION SIMPLE: RIGHT FOREHEAD

## 2023-01-03 ASSESSMENT — LOCATION ZONE DERM: LOCATION ZONE: FACE

## 2023-01-03 NOTE — PROCEDURE: ADDITIONAL NOTES
Detail Level: Zone
Render Risk Assessment In Note?: no
Additional Notes: We used an office sample for the patient’s touch up so the patient would not be charged for his visit today.

## 2023-05-17 ENCOUNTER — APPOINTMENT (RX ONLY)
Dept: URBAN - METROPOLITAN AREA CLINIC 374 | Facility: CLINIC | Age: 73
Setting detail: DERMATOLOGY
End: 2023-05-17

## 2023-05-17 DIAGNOSIS — L98.8 OTHER SPECIFIED DISORDERS OF THE SKIN AND SUBCUTANEOUS TISSUE: ICD-10-CM

## 2023-05-17 PROCEDURE — ? PHOTO-DOCUMENTATION

## 2023-05-17 PROCEDURE — ? ADDITIONAL NOTES

## 2023-05-17 PROCEDURE — ? BOTOX (U OR CC)

## 2023-05-17 ASSESSMENT — LOCATION SIMPLE DESCRIPTION DERM
LOCATION SIMPLE: LEFT FOREHEAD
LOCATION SIMPLE: RIGHT FOREHEAD
LOCATION SIMPLE: SUPERIOR FOREHEAD

## 2023-05-17 ASSESSMENT — LOCATION DETAILED DESCRIPTION DERM
LOCATION DETAILED: SUPERIOR MID FOREHEAD
LOCATION DETAILED: RIGHT SUPERIOR MEDIAL FOREHEAD
LOCATION DETAILED: LEFT SUPERIOR MEDIAL FOREHEAD
LOCATION DETAILED: LEFT SUPERIOR FOREHEAD
LOCATION DETAILED: LEFT FOREHEAD
LOCATION DETAILED: RIGHT FOREHEAD
LOCATION DETAILED: RIGHT INFERIOR LATERAL FOREHEAD
LOCATION DETAILED: LEFT INFERIOR FOREHEAD
LOCATION DETAILED: RIGHT MEDIAL FOREHEAD

## 2023-05-17 ASSESSMENT — LOCATION ZONE DERM: LOCATION ZONE: FACE

## 2023-05-17 NOTE — PROCEDURE: ADDITIONAL NOTES
Detail Level: Zone
Render Risk Assessment In Note?: no
Additional Notes: Patient would like to do 20 units today only as he wants to limit cost. He is aware that the 20 units may not be enough to fully relax the strong muscles and fade lines.

## 2023-08-02 ENCOUNTER — APPOINTMENT (RX ONLY)
Dept: URBAN - METROPOLITAN AREA CLINIC 374 | Facility: CLINIC | Age: 73
Setting detail: DERMATOLOGY
End: 2023-08-02

## 2023-08-02 DIAGNOSIS — L98.8 OTHER SPECIFIED DISORDERS OF THE SKIN AND SUBCUTANEOUS TISSUE: ICD-10-CM

## 2023-08-02 PROCEDURE — ? ADDITIONAL NOTES

## 2023-08-02 PROCEDURE — ? PHOTO-DOCUMENTATION

## 2023-08-02 PROCEDURE — ? BOTOX (U OR CC)

## 2023-08-02 ASSESSMENT — LOCATION SIMPLE DESCRIPTION DERM
LOCATION SIMPLE: INFERIOR FOREHEAD
LOCATION SIMPLE: RIGHT FOREHEAD
LOCATION SIMPLE: LEFT FOREHEAD

## 2023-08-02 ASSESSMENT — LOCATION ZONE DERM: LOCATION ZONE: FACE

## 2023-08-02 ASSESSMENT — LOCATION DETAILED DESCRIPTION DERM
LOCATION DETAILED: INFERIOR MID FOREHEAD
LOCATION DETAILED: RIGHT FOREHEAD
LOCATION DETAILED: LEFT MEDIAL FOREHEAD
LOCATION DETAILED: LEFT LATERAL FOREHEAD

## 2023-10-05 ENCOUNTER — APPOINTMENT (RX ONLY)
Dept: URBAN - METROPOLITAN AREA CLINIC 374 | Facility: CLINIC | Age: 73
Setting detail: DERMATOLOGY
End: 2023-10-05

## 2023-10-05 DIAGNOSIS — L98.8 OTHER SPECIFIED DISORDERS OF THE SKIN AND SUBCUTANEOUS TISSUE: ICD-10-CM

## 2023-10-05 PROCEDURE — ? ADDITIONAL NOTES

## 2023-10-05 PROCEDURE — ? PHOTO-DOCUMENTATION

## 2023-10-05 PROCEDURE — ? BOTOX (U OR CC)

## 2023-10-05 ASSESSMENT — LOCATION DETAILED DESCRIPTION DERM
LOCATION DETAILED: LEFT LATERAL FOREHEAD
LOCATION DETAILED: INFERIOR MID FOREHEAD
LOCATION DETAILED: LEFT MEDIAL FOREHEAD
LOCATION DETAILED: RIGHT FOREHEAD

## 2023-10-05 ASSESSMENT — LOCATION SIMPLE DESCRIPTION DERM
LOCATION SIMPLE: INFERIOR FOREHEAD
LOCATION SIMPLE: LEFT FOREHEAD
LOCATION SIMPLE: RIGHT FOREHEAD

## 2023-10-05 ASSESSMENT — LOCATION ZONE DERM: LOCATION ZONE: FACE

## 2023-10-05 NOTE — PROCEDURE: BOTOX (U OR CC)
Price (Use Numbers Only, No Special Characters Or $): 263 Price (Use Numbers Only, No Special Characters Or $): 641

## 2024-01-18 ENCOUNTER — APPOINTMENT (RX ONLY)
Dept: URBAN - METROPOLITAN AREA CLINIC 374 | Facility: CLINIC | Age: 74
Setting detail: DERMATOLOGY
End: 2024-01-18

## 2024-01-18 DIAGNOSIS — L98.8 OTHER SPECIFIED DISORDERS OF THE SKIN AND SUBCUTANEOUS TISSUE: ICD-10-CM

## 2024-01-18 PROCEDURE — ? BOTOX (U OR CC)

## 2024-01-18 PROCEDURE — ? PHOTO-DOCUMENTATION

## 2024-01-18 ASSESSMENT — LOCATION SIMPLE DESCRIPTION DERM
LOCATION SIMPLE: PROCERUS
LOCATION SIMPLE: RIGHT FRONTALIS
LOCATION SIMPLE: LEFT FRONTALIS
LOCATION SIMPLE: RIGHT CORRUGATOR SUPERCILIARIS
LOCATION SIMPLE: LEFT CORRUGATOR SUPERCILIARIS

## 2024-01-18 ASSESSMENT — LOCATION DETAILED DESCRIPTION DERM
LOCATION DETAILED: PROCERUS
LOCATION DETAILED: LEFT FRONTALIS
LOCATION DETAILED: RIGHT CORRUGATOR SUPERCILIARIS
LOCATION DETAILED: RIGHT FRONTALIS
LOCATION DETAILED: LEFT CORRUGATOR SUPERCILIARIS

## 2024-01-18 ASSESSMENT — LOCATION ZONE DERM: LOCATION ZONE: FACE

## 2024-01-18 NOTE — PROCEDURE: BOTOX (U OR CC)
Lot #: [id number]
Additional Area 2 Units: 0
Price (Use Numbers Only, No Special Characters Or $): 384
Consent: Written consent obtained. Risks include but not limited to lid/brow ptosis, bruising, swelling, diplopia, temporary effect, incomplete chemical denervation.
Document As Units Or Cc?: units
Including Pricing Information In The Note: No
Glabellar Complex Units: 12
Dilution (U/0.1 Cc): 4
Expiration Date (Month Year): 05/2025
Detail Level: Simple
Post-Care Instructions: Patient instructed to not lie down for 4 hours and limit physical activity for 24 hours.
Quantity Per Injection Site (Units Or Cc): 4 U
Quantity Per Injection Site (Units Or Cc): 2 U

## 2024-03-21 ENCOUNTER — APPOINTMENT (RX ONLY)
Dept: URBAN - METROPOLITAN AREA CLINIC 374 | Facility: CLINIC | Age: 74
Setting detail: DERMATOLOGY
End: 2024-03-21

## 2024-03-21 DIAGNOSIS — L98.8 OTHER SPECIFIED DISORDERS OF THE SKIN AND SUBCUTANEOUS TISSUE: ICD-10-CM

## 2024-03-21 PROCEDURE — ? BOTOX (U OR CC)

## 2024-03-21 PROCEDURE — ? PHOTO-DOCUMENTATION

## 2024-03-21 ASSESSMENT — LOCATION SIMPLE DESCRIPTION DERM
LOCATION SIMPLE: RIGHT FRONTALIS
LOCATION SIMPLE: LEFT TEMPORALIS
LOCATION SIMPLE: LEFT FRONTALIS
LOCATION SIMPLE: PROCERUS
LOCATION SIMPLE: RIGHT CORRUGATOR SUPERCILIARIS

## 2024-03-21 ASSESSMENT — LOCATION DETAILED DESCRIPTION DERM
LOCATION DETAILED: LEFT FRONTALIS
LOCATION DETAILED: RIGHT FRONTALIS
LOCATION DETAILED: LEFT TEMPORALIS
LOCATION DETAILED: RIGHT CORRUGATOR SUPERCILIARIS
LOCATION DETAILED: PROCERUS

## 2024-03-21 ASSESSMENT — LOCATION ZONE DERM: LOCATION ZONE: FACE

## 2024-03-21 NOTE — PROCEDURE: BOTOX (U OR CC)
Lot #: [id number]
Additional Area 2 Units: 0
Price (Use Numbers Only, No Special Characters Or $): 384
Consent: Written consent obtained. Risks include but not limited to lid/brow ptosis, bruising, swelling, diplopia, temporary effect, incomplete chemical denervation.
Document As Units Or Cc?: units
Including Pricing Information In The Note: No
Glabellar Complex Units: 12
Dilution (U/0.1 Cc): 2.5
Expiration Date (Month Year): 05/2025
Detail Level: Simple
Post-Care Instructions: Patient instructed to not lie down for 4 hours and limit physical activity for 24 hours.
Quantity Per Injection Site (Units Or Cc): 2 U
Quantity Per Injection Site (Units Or Cc): 4 U
Quantity Per Injection Site (Units Or Cc): 1 U

## 2024-06-20 ENCOUNTER — APPOINTMENT (RX ONLY)
Dept: URBAN - METROPOLITAN AREA CLINIC 374 | Facility: CLINIC | Age: 74
Setting detail: DERMATOLOGY
End: 2024-06-20

## 2024-06-20 DIAGNOSIS — L98.8 OTHER SPECIFIED DISORDERS OF THE SKIN AND SUBCUTANEOUS TISSUE: ICD-10-CM

## 2024-06-20 PROCEDURE — ? BOTOX (U OR CC)

## 2024-06-20 ASSESSMENT — LOCATION DETAILED DESCRIPTION DERM
LOCATION DETAILED: RIGHT SUPERIOR MEDIAL FOREHEAD
LOCATION DETAILED: GLABELLA
LOCATION DETAILED: LEFT SUPERIOR FOREHEAD
LOCATION DETAILED: RIGHT FOREHEAD
LOCATION DETAILED: LEFT INFERIOR MEDIAL FOREHEAD
LOCATION DETAILED: RIGHT LATERAL FOREHEAD
LOCATION DETAILED: LEFT LATERAL FOREHEAD
LOCATION DETAILED: RIGHT SUPERIOR FOREHEAD
LOCATION DETAILED: RIGHT INFERIOR MEDIAL FOREHEAD

## 2024-06-20 ASSESSMENT — LOCATION SIMPLE DESCRIPTION DERM
LOCATION SIMPLE: GLABELLA
LOCATION SIMPLE: RIGHT FOREHEAD
LOCATION SIMPLE: LEFT FOREHEAD

## 2024-06-20 ASSESSMENT — LOCATION ZONE DERM: LOCATION ZONE: FACE

## 2024-06-20 NOTE — PROCEDURE: BOTOX (U OR CC)
Detail Level: Detailed
Document As Units Or Cc?: units
Forehead Units/Cc: 12
Periorbital Skin Units/Cc: 0
Glabellar Complex Units: 15
Lot #: [id number]
Dilution (U/0.1 Cc): 4
Expiration Date (Month Year): 07/2026
Price (Use Numbers Only, No Special Characters Or $): 920.10
Consent obtained. Risks include but not limited to lid/brow ptosis, bruising, swelling, diplopia, temporary effect, incomplete chemical denervation.
Post-Care Instructions: Patient instructed to not lie down for 4 hours and limit physical activity for 24 hours.
Including Pricing Information In The Note: No
Quantity Per Injection Site (Units Or Cc): 5 U
Quantity Per Injection Site (Units Or Cc): 2 U
Quantity Per Injection Site (Units Or Cc): 1 U

## 2024-08-28 ENCOUNTER — APPOINTMENT (RX ONLY)
Dept: URBAN - METROPOLITAN AREA CLINIC 374 | Facility: CLINIC | Age: 74
Setting detail: DERMATOLOGY
End: 2024-08-28

## 2024-08-28 DIAGNOSIS — Z41.9 ENCOUNTER FOR PROCEDURE FOR PURPOSES OTHER THAN REMEDYING HEALTH STATE, UNSPECIFIED: ICD-10-CM

## 2024-08-28 PROCEDURE — ? BOTOX (U OR CC)

## 2024-08-28 ASSESSMENT — LOCATION DETAILED DESCRIPTION DERM
LOCATION DETAILED: GLABELLA
LOCATION DETAILED: RIGHT FOREHEAD
LOCATION DETAILED: RIGHT SUPERIOR FOREHEAD
LOCATION DETAILED: LEFT LATERAL FOREHEAD
LOCATION DETAILED: RIGHT INFERIOR FOREHEAD
LOCATION DETAILED: LEFT INFERIOR MEDIAL FOREHEAD
LOCATION DETAILED: LEFT FOREHEAD
LOCATION DETAILED: LEFT SUPERIOR FOREHEAD
LOCATION DETAILED: SUPERIOR MID FOREHEAD

## 2024-08-28 ASSESSMENT — LOCATION SIMPLE DESCRIPTION DERM
LOCATION SIMPLE: RIGHT FOREHEAD
LOCATION SIMPLE: SUPERIOR FOREHEAD
LOCATION SIMPLE: GLABELLA
LOCATION SIMPLE: LEFT FOREHEAD

## 2024-08-28 ASSESSMENT — LOCATION ZONE DERM: LOCATION ZONE: FACE

## 2024-08-28 NOTE — PROCEDURE: BOTOX (U OR CC)
Nasal Root Units/Cc: 0
Including Pricing Information In The Note: No
Lot #: [id number]
Dilution (U/0.1 Cc): 4
Forehead Units/Cc: 16
Glabellar Complex Units: 9
Consent: Written consent obtained. Risks include but not limited to lid/brow ptosis, bruising, swelling, diplopia, temporary effect, incomplete chemical denervation.
Post-Care Instructions: Patient instructed to not lie down for 4 hours and limit physical activity for 24 hours.
Expiration Date (Month Year): 06/2026
Price (Use Numbers Only, No Special Characters Or $): 400
Detail Level: Detailed
Document As Units Or Cc?: units
Quantity Per Injection Site (Units Or Cc): 3 U
Quantity Per Injection Site (Units Or Cc): 2 U

## 2024-12-10 ENCOUNTER — APPOINTMENT (OUTPATIENT)
Dept: URBAN - METROPOLITAN AREA CLINIC 374 | Facility: CLINIC | Age: 74
Setting detail: DERMATOLOGY
End: 2024-12-10

## 2024-12-10 DIAGNOSIS — Z41.9 ENCOUNTER FOR PROCEDURE FOR PURPOSES OTHER THAN REMEDYING HEALTH STATE, UNSPECIFIED: ICD-10-CM

## 2024-12-10 PROCEDURE — ? BOTOX (U OR CC)

## 2024-12-10 ASSESSMENT — LOCATION DETAILED DESCRIPTION DERM
LOCATION DETAILED: SUPERIOR MID FOREHEAD
LOCATION DETAILED: LEFT INFERIOR MEDIAL FOREHEAD
LOCATION DETAILED: GLABELLA
LOCATION DETAILED: RIGHT FOREHEAD
LOCATION DETAILED: RIGHT SUPERIOR FOREHEAD
LOCATION DETAILED: LEFT FOREHEAD
LOCATION DETAILED: RIGHT INFERIOR FOREHEAD
LOCATION DETAILED: LEFT LATERAL FOREHEAD
LOCATION DETAILED: LEFT SUPERIOR FOREHEAD

## 2024-12-10 ASSESSMENT — LOCATION SIMPLE DESCRIPTION DERM
LOCATION SIMPLE: LEFT FOREHEAD
LOCATION SIMPLE: RIGHT FOREHEAD
LOCATION SIMPLE: SUPERIOR FOREHEAD
LOCATION SIMPLE: GLABELLA

## 2024-12-10 ASSESSMENT — LOCATION ZONE DERM: LOCATION ZONE: FACE

## 2024-12-10 NOTE — PROCEDURE: BOTOX (U OR CC)
Nasal Root Units/Cc: 0
Including Pricing Information In The Note: No
Lot #: [id number]
Dilution (U/0.1 Cc): 4
Forehead Units/Cc: 16
Glabellar Complex Units: 9
Consent: Written consent obtained. Risks include but not limited to lid/brow ptosis, bruising, swelling, diplopia, temporary effect, incomplete chemical denervation.
Post-Care Instructions: Patient instructed to not lie down for 4 hours and limit physical activity for 24 hours.
Expiration Date (Month Year): 06/2026
Price (Use Numbers Only, No Special Characters Or $): 400
Detail Level: Detailed
Document As Units Or Cc?: units
Quantity Per Injection Site (Units Or Cc): 3 U
Quantity Per Injection Site (Units Or Cc): 2 U

## 2025-01-08 ENCOUNTER — TELEPHONE (OUTPATIENT)
Age: 75
End: 2025-01-08

## 2025-01-08 NOTE — TELEPHONE ENCOUNTER
New Patient    What is the reason for the patient’s appointment?:    What office location does the patient prefer?:[place]    Does patient have Imaging/Lab Results:Urinalysis done with PCP office 12/2024    Have patient records been requested?:Yes  If No, are the records showing in Epic:       INSURANCE:   Do we accept the patient's insurance or is the patient Self-Pay?:Yes    Insurance Provider:Medicare  Plan Type/Number:   Member ID#: [id number]        HISTORY:   Has the patient had any previous Urologist(s)?:No    Was the patient seen in the ED?:No    Has the patient had any outside testing done?:Urinalysis    Does the patient have a personal history of cancer?:No    Pt sts that he has been having intermittent burning in his penis.  Pt sts that burning is not happening all the time or when he is urinating.  Pt sts his PCP had him do urine testing, which came back negative.  Pt's PCP placed him on ABX, which did not help, and then recommended pt schedule appt with Urology.  Pt is having results and records faxed to [place] office prior to his appt.  Schedule pt with the AP on 2/25/25.    Pt call back: [phone]

## 2025-01-15 NOTE — PROGRESS NOTES
Name: Maximo Soto      : 1950      MRN: [id number]  Encounter Provider: GUDELIA Belcher  Encounter Date: 2025   Encounter department: [facility] [place]  :  Assessment & Plan  Pain in urethra  Patient reports intermittent burning in his penis, not associated with urination  He first noticed it about 1 month ago  He reports the burning sensation is very mild and only slightly noticeable, has not gotten any worse  24 STD testing negative   24 & 24 UA negative for infection or blood  He was on an antibiotic which did not help   He denies any other urinary symptoms such as dysuria, urgency, frequency, flank/abdominal pain, weak stream, straining, feelings of incomplete bladder emptying, gross hematuria, or penile discharge   He denies a history of kidney stones or UTI's  Recommend increasing hydration, decreasing bladder irritants   Recommend kidney/bladder US   Will contact with results  Discussed cystoscopy if burning sensation persists   Orders:    US kidney and bladder; Future    Chronic kidney disease, unspecified CKD stage  Creatinine 2.94  GFR 22  He reports his baseline creat is 2.5   He has a history of body building when he was younger and anabolic steroid use   Referral placed for nephrology   Orders:    Ambulatory Referral to Nephrology; Future    Screening for prostate cancer  PSA on 24 was 0.73  Managed by PCP          History of Present Illness   Maximo Soto is a 74 y.o. male who presents in consultation for penile burning, referred to our office by his PCP, Dr. Dela Cruz.   He reports having intermittent mild burning inside his urethra starting about 1 month ago. It is intermittent. It is not related to his urination. He feels the sensation randomly at rest. He has had negative urine testing and STD testing done in .   He reports taking Nutrofol and Farxiga and unsure if the burning sensation is related to these medications. He  "denies any other bothersome urinary symptoms, no dysuria, urgency, frequency, flank/abdominal pain, straining, feelings of incomplete bladder emptying, weak stream, penile discharge, or gross hematuria. He denies a history of UTI's or kidney stones. He denies any prior injury or surgery to his penis/testes.   He reports a history of CKD with a baseline creatinine of 2.5 his most recent creatinine was 2.94 and GFR of 22. He reports a history of body building and steroid use when he was younger.       Review of Systems   Constitutional:  Negative for chills, diaphoresis, fatigue and fever.   Respiratory:  Negative for cough and shortness of breath.    Gastrointestinal:  Negative for abdominal pain, diarrhea, nausea and vomiting.   Genitourinary:  Negative for decreased urine volume, difficulty urinating, dysuria, flank pain, frequency, hematuria and urgency.        Intermittent burning sensation inside penis   Musculoskeletal:  Negative for back pain and myalgias.   Skin:  Negative for pallor and wound.   Neurological:  Negative for dizziness, weakness, light-headedness and numbness.     Objective   /84 (BP Location: Left arm, Patient Position: Sitting, Cuff Size: Adult)   Pulse 59   Ht 5' 9\" (1.753 m)   Wt 80.7 kg (178 lb)   SpO2 99%   BMI 26.29 kg/m²     Physical Exam  Constitutional:       Appearance: Normal appearance.   HENT:      Head: Normocephalic and atraumatic.   Eyes:      Conjunctiva/sclera: Conjunctivae normal.   Pulmonary:      Effort: Pulmonary effort is normal. No respiratory distress.   Musculoskeletal:         General: Normal range of motion.      Cervical back: Normal range of motion.   Skin:     General: Skin is warm and dry.   Neurological:      General: No focal deficit present.      Mental Status: He is alert and oriented to person, place, and time.   Psychiatric:         Mood and Affect: Mood normal.         Behavior: Behavior normal.         "

## 2025-01-16 ENCOUNTER — OFFICE VISIT (OUTPATIENT)
Dept: UROLOGY | Facility: HOSPITAL | Age: 75
End: 2025-01-16
Payer: MEDICARE

## 2025-01-16 VITALS
OXYGEN SATURATION: 99 % | SYSTOLIC BLOOD PRESSURE: 142 MMHG | WEIGHT: 178 LBS | HEART RATE: 59 BPM | BODY MASS INDEX: 26.36 KG/M2 | DIASTOLIC BLOOD PRESSURE: 84 MMHG | HEIGHT: 69 IN

## 2025-01-16 DIAGNOSIS — Z12.5 SCREENING FOR PROSTATE CANCER: Primary | ICD-10-CM

## 2025-01-16 DIAGNOSIS — R39.89 PAIN IN URETHRA: ICD-10-CM

## 2025-01-16 DIAGNOSIS — N18.9 CHRONIC KIDNEY DISEASE, UNSPECIFIED CKD STAGE: ICD-10-CM

## 2025-01-16 PROCEDURE — 99204 OFFICE O/P NEW MOD 45 MIN: CPT

## 2025-01-16 RX ORDER — ALPRAZOLAM 0.5 MG
TABLET ORAL
COMMUNITY
Start: 2024-11-21

## 2025-01-16 RX ORDER — OLMESARTAN MEDOXOMIL 20 MG/1
TABLET ORAL
COMMUNITY

## 2025-01-16 RX ORDER — ALLOPURINOL 100 MG/1
100 TABLET ORAL DAILY
COMMUNITY

## 2025-01-16 RX ORDER — OMEGA-3 FATTY ACIDS/FISH OIL 300-1000MG
CAPSULE ORAL
COMMUNITY

## 2025-01-16 RX ORDER — AMOXICILLIN 500 MG/1
CAPSULE ORAL
COMMUNITY
Start: 2025-01-10

## 2025-01-22 ENCOUNTER — HOSPITAL ENCOUNTER (OUTPATIENT)
Dept: ULTRASOUND IMAGING | Facility: HOSPITAL | Age: 75
Discharge: HOME | End: 2025-01-22
Payer: MEDICARE

## 2025-01-22 DIAGNOSIS — R39.89 PAIN IN URETHRA: ICD-10-CM

## 2025-01-22 PROCEDURE — 76775 US EXAM ABDO BACK WALL LIM: CPT

## 2025-01-31 ENCOUNTER — TELEPHONE (OUTPATIENT)
Age: 75
End: 2025-01-31

## 2025-01-31 NOTE — TELEPHONE ENCOUNTER
Patient calling in to follow up on KUB results and to inquire about next steps. Please advise.     IMPRESSION:     Echogenic kidneys compatible with chronic medical disease.     No hydronephrosis. Bilateral ureteral jets are visualized.     Multiple bilateral renal cysts without suspicious features, more numerous and larger on the left.

## 2025-02-03 NOTE — TELEPHONE ENCOUNTER
Patient called in regarding his US results. Patient reports having a burning sensation after urinating for over a month now and would like to know what this could be related to. Please review and advise.       IMPRESSION:     Echogenic kidneys compatible with chronic medical disease.     No hydronephrosis. Bilateral ureteral jets are visualized.     Multiple bilateral renal cysts without suspicious features, more numerous and larger on the left.

## 2025-02-04 NOTE — TELEPHONE ENCOUNTER
Patient calling for the third time to obtain his US results    Patient states he is upset over having to call multiple times      #[phone]

## 2025-02-04 NOTE — TELEPHONE ENCOUNTER
Renal ultrasound reviewed:     1.  Kidneys appear compatible with his known chronic kidney disease  2.  No kidney swelling, no kidney stones  3.  Multiple kidney cysts without suspicious features, no further intervention needed  4.  Bladder appears normal and both ureters are visualized  5.  No masses or lesions visualized within the bladder or bilateral kidneys.     Nothing visualized on renal ultrasound would contribute to patient's ongoing dysuria.  If patient's burning continues can consider being scheduled for nonemergent cystoscopy in office for further evaluation.    Per patient 's request - scheduled him a Cysto in [place] with Dr River

## 2025-02-04 NOTE — TELEPHONE ENCOUNTER
Renal ultrasound reviewed:    1.  Kidneys appear compatible with his known chronic kidney disease  2.  No kidney swelling, no kidney stones  3.  Multiple kidney cysts without suspicious features, no further intervention needed  4.  Bladder appears normal and both ureters are visualized  5.  No masses or lesions visualized within the bladder or bilateral kidneys.    Nothing visualized on renal ultrasound would contribute to patient's ongoing dysuria.  If patient's burning continues can consider being scheduled for nonemergent cystoscopy in office for further evaluation.

## 2025-02-15 PROBLEM — Z12.5 SCREENING FOR PROSTATE CANCER: Status: RESOLVED | Noted: 2025-01-16 | Resolved: 2025-02-15

## 2025-03-07 NOTE — PROGRESS NOTES
Name: Maximo Soto      : 1950      MRN: [id number]  Encounter Provider: Gustavo River MD  Encounter Date: 3/12/2025   Encounter department: [facility] [place]  :  Assessment & Plan  Encounter to discuss test results         Benign prostatic hyperplasia with urinary frequency  PSA 0.74  Low risk for prostate cancer         Renal cyst  Simple, require no intervention or further imaging work up         Dysuria  Unclear source  No stricture, no stone, no tumors  Discussed changes in fluid intake and dietary changes (such as an elimination diet)  Follow up PRN         I have spent a total time of 30 minutes in caring for this patient on the day of the visit/encounter including Diagnostic results, Prognosis, Risks and benefits of tx options, Instructions for management, Patient and family education, Importance of tx compliance, Risk factor reductions, Impressions, Counseling / Coordination of care, Documenting in the medical record, and Reviewing/placing orders in the medical record (including tests, medications, and/or procedures). This is in addition to the time necessary for cystoscopy today    History of Present Illness   Maximo Soto is a 74 y.o. male who presents with dysuria  No concerning findings on renal us (independent interpretation of imaging today)  S/p cystoscopy today  Dysuria off and on for 7 weeks  Kidney function does not allow for pyridium    The following portions of the patient's history were reviewed and updated as appropriate: allergies, current medications, past family history, past medical history, past social history, past surgical history and problem list.      Review of Systems   Constitutional: Negative.    HENT: Negative.     Eyes: Negative.    Respiratory: Negative.     Cardiovascular: Negative.    Gastrointestinal: Negative.    Endocrine: Negative.    Genitourinary:  Positive for dysuria.   Musculoskeletal: Negative.    Skin: Negative.   "  Allergic/Immunologic: Negative.    Neurological: Negative.    Hematological: Negative.    Psychiatric/Behavioral: Negative.            Objective   There were no vitals taken for this visit.    Physical Exam  Vitals reviewed.   Constitutional:       General: He is not in acute distress.     Appearance: Normal appearance. He is well-developed. He is not ill-appearing, toxic-appearing or diaphoretic.   HENT:      Head: Normocephalic and atraumatic.      Nose: Nose normal.      Mouth/Throat:      Mouth: Mucous membranes are moist.   Eyes:      General: No scleral icterus.        Right eye: No discharge.         Left eye: No discharge.   Neck:      Thyroid: No thyromegaly.      Trachea: No tracheal deviation.   Pulmonary:      Effort: Pulmonary effort is normal.   Chest:      Chest wall: No tenderness.   Abdominal:      General: There is no distension.      Palpations: There is no mass.      Tenderness: There is no abdominal tenderness. There is no guarding.      Hernia: No hernia is present.   Genitourinary:     Penis: Normal.    Musculoskeletal:         General: No deformity or signs of injury. Normal range of motion.      Cervical back: Normal range of motion and neck supple.   Lymphadenopathy:      Cervical: No cervical adenopathy.   Skin:     General: Skin is dry.      Coloration: Skin is not jaundiced or pale.      Findings: No erythema.   Neurological:      Mental Status: He is alert and oriented to person, place, and time.      Cranial Nerves: No cranial nerve deficit.      Motor: No abnormal muscle tone.      Coordination: Coordination normal.   Psychiatric:         Mood and Affect: Mood normal.         Behavior: Behavior normal.         Thought Content: Thought content normal.         Judgment: Judgment normal.          Results   No results found for: \"PSA\"  No results found for: \"GLUCOSE\", \"CALCIUM\", \"NA\", \"K\", \"CO2\", \"CL\", \"BUN\", \"CREATININE\"  No results found for: \"WBC\", \"HGB\", \"HCT\", \"MCV\", " "\"PLT\"    Office Urine Dip  No results found for this or any previous visit (from the past hour).      "

## 2025-03-12 ENCOUNTER — PROCEDURE VISIT (OUTPATIENT)
Dept: UROLOGY | Facility: HOSPITAL | Age: 75
End: 2025-03-12
Payer: MEDICARE

## 2025-03-12 VITALS — SYSTOLIC BLOOD PRESSURE: 140 MMHG | HEIGHT: 69 IN | DIASTOLIC BLOOD PRESSURE: 80 MMHG | BODY MASS INDEX: 26.29 KG/M2

## 2025-03-12 DIAGNOSIS — R35.0 BENIGN PROSTATIC HYPERPLASIA WITH URINARY FREQUENCY: ICD-10-CM

## 2025-03-12 DIAGNOSIS — Z71.2 ENCOUNTER TO DISCUSS TEST RESULTS: Primary | ICD-10-CM

## 2025-03-12 DIAGNOSIS — N40.1 BENIGN PROSTATIC HYPERPLASIA WITH URINARY FREQUENCY: ICD-10-CM

## 2025-03-12 DIAGNOSIS — R30.0 DYSURIA: ICD-10-CM

## 2025-03-12 DIAGNOSIS — N28.1 RENAL CYST: ICD-10-CM

## 2025-03-12 PROCEDURE — 99214 OFFICE O/P EST MOD 30 MIN: CPT | Performed by: UROLOGY

## 2025-03-12 PROCEDURE — 52000 CYSTOURETHROSCOPY: CPT | Performed by: UROLOGY

## 2025-03-12 NOTE — ASSESSMENT & PLAN NOTE
Unclear source  No stricture, no stone, no tumors  Discussed changes in fluid intake and dietary changes (such as an elimination diet)  Follow up PRN

## 2025-03-12 NOTE — PROGRESS NOTES
Office Cystoscopy Procedure Note    Indication:    dysuria    Informed consent   The risks, benefits, complications, treatment options, and expected outcomes were discussed with the patient. The patient concurred with the proposed plan and provided informed consent.    Anesthesia  Lidocaine jelly 2%    Antibiotic prophylaxis   none    Procedure  The patient was placed in the supineposition, was prepped and draped in the usual manner using sterile technique, and 2% lidocaine jelly instilled into the urethra.  A 17 F flexible cystoscope was then inserted into the urethra and the urethra and bladder carefully examined.  The following findings were noted:    Findings:  Urethra:  Normal  Prostate:  mild lateral lobe hypertrophy, no median lobe, no lesions  Bladder:  Clean, no lesions  Ureteral orifices:  orthotopic  Other findings:  None, retroflexed view confirms    Specimens: None                 Complications:    None; patient tolerated the procedure well           Disposition: To home            Condition: Stable    Plan: Normal cystoscopy, no stricture, no tumors, no lesions. No anatomic explanation for the patient's dysuria       Cystoscopy     Date/Time  3/12/2025 8:00 AM     Performed by  Gustavo River MD   Authorized by  Gustavo River MD     Universal Protocol:  procedure performed by consultantConsent: Verbal consent obtained. Written consent obtained.  Risks and benefits: risks, benefits and alternatives were discussed  Consent given by: patient  Patient understanding: patient states understanding of the procedure being performed  Patient consent: the patient's understanding of the procedure matches consent given  Procedure consent: procedure consent matches procedure scheduled  Relevant documents: relevant documents present and verified  Test results: test results available and properly labeled  Site marked: the operative site was not marked  Radiology Images displayed and confirmed. If images not  available, report reviewed: imaging studies available  Required items: required blood products, implants, devices, and special equipment available  Patient identity confirmed: verbally with patient and provided demographic data      Procedure Details:  Procedure type: cystoscopy    Patient tolerance: Patient tolerated the procedure well with no immediate complications    Additional Procedure Details: Office Cystoscopy Procedure Note    Indication:    dysuria    Informed consent   The risks, benefits, complications, treatment options, and expected outcomes were discussed with the patient. The patient concurred with the proposed plan and provided informed consent.    Anesthesia  Lidocaine jelly 2%    Antibiotic prophylaxis   none    Procedure  The patient was placed in the supineposition, was prepped and draped in the usual manner using sterile technique, and 2% lidocaine jelly instilled into the urethra.  A 17 F flexible cystoscope was then inserted into the urethra and the urethra and bladder carefully examined.  The following findings were noted:    Findings:  Urethra:  Normal  Prostate:  mild lateral lobe hypertrophy, no median lobe, no lesions  Bladder:  Clean, no lesions  Ureteral orifices:  orthotopic  Other findings:  None, retroflexed view confirms    Specimens: None                 Complications:    None; patient tolerated the procedure well           Disposition: To home            Condition: Stable    Plan: Normal cystoscopy, no stricture, no tumors, no lesions. No anatomic explanation for the patient's dysuria

## 2025-03-27 ENCOUNTER — TELEPHONE (OUTPATIENT)
Age: 75
End: 2025-03-27

## 2025-03-27 NOTE — TELEPHONE ENCOUNTER
Patient called in to verify which doctor he saw for cystoscopy. Patient was given doctors name.

## 2025-04-16 ENCOUNTER — TELEPHONE (OUTPATIENT)
Age: 75
End: 2025-04-16

## 2025-04-16 NOTE — TELEPHONE ENCOUNTER
Patient seen by Dr. River at [place]    Patient called stating he went to he nephrologist and he stated he went over the results of his u/s of kidney/bladder from 01/22/25 and he was concerned on the size of kidney cyst . Patient stated he was told there is a big discrepancy between the two.  He wanted to know if there is a typo on the measurements.  He wants a call to discuss. He is very concerned about it now. He just wants confirmation on the sizes.      Please review and advise.    Patient can be reached at [phone]

## 2025-04-17 NOTE — TELEPHONE ENCOUNTER
Renal US again reviewed- there are multiple renal cysts bilaterally.   The largest in the left kidney measures 6.3cm and the largest in the right kidney measures 3.8cm. These are simple renal cysts and as discussed by Dr. River, they do not require further intervention.   Unable to see notes from patients nephrologist regarding the discrepancy in sizes..

## 2025-04-18 NOTE — TELEPHONE ENCOUNTER
LM that Renal US again reviewed- there are multiple renal cysts bilaterally.   The largest in the left kidney measures 6.3cm and the largest in the right kidney measures 3.8cm. These are simple renal cysts and as discussed by Dr. River, they do not require further intervention.

## 2025-04-18 NOTE — TELEPHONE ENCOUNTER
Patient calling back in. States he misspoke when he called with question earlier. [facility] nephrologist had questions regarding US from 1/22 and the measurements of the Kidney sizes. Not the cyst sizes. Patient inquiring if these measurements are accurate. [facility] nephrologist ([email or web address]) feels this is too big a discrepancy between kidney sizes.     KIDNEYS:  Symmetric and normal size.  Right kidney: 9.3 x 6.0 x 5.7 cm. Volume 166.6 mL  Left kidney: 17.0 x 6.0 x 5.7 cm. Volume 308.1 mL

## 2025-04-21 NOTE — TELEPHONE ENCOUNTER
"Left message per communication form advising patients of AP's note, \"Those are the appropriate measurements provided by radiology based on his imaging. If his nephrologist does not think they are accurate, they can order a repeat US.\" Office number provided with any further questions or concerns.   "

## 2025-04-21 NOTE — TELEPHONE ENCOUNTER
Patient returning missed call, I provided him the information per Tabitha. Patient verbalized understanding.

## 2025-04-29 ENCOUNTER — APPOINTMENT (OUTPATIENT)
Dept: URBAN - METROPOLITAN AREA CLINIC 374 | Facility: CLINIC | Age: 75
Setting detail: DERMATOLOGY
End: 2025-04-29

## 2025-04-29 DIAGNOSIS — Z41.9 ENCOUNTER FOR PROCEDURE FOR PURPOSES OTHER THAN REMEDYING HEALTH STATE, UNSPECIFIED: ICD-10-CM

## 2025-04-29 PROCEDURE — ? BOTOX (U OR CC)

## 2025-04-29 ASSESSMENT — LOCATION DETAILED DESCRIPTION DERM
LOCATION DETAILED: RIGHT FOREHEAD
LOCATION DETAILED: GLABELLA
LOCATION DETAILED: RIGHT SUPERIOR FOREHEAD
LOCATION DETAILED: LEFT FOREHEAD
LOCATION DETAILED: SUPERIOR MID FOREHEAD
LOCATION DETAILED: LEFT SUPERIOR FOREHEAD
LOCATION DETAILED: LEFT LATERAL FOREHEAD
LOCATION DETAILED: LEFT SUPERIOR MEDIAL FOREHEAD
LOCATION DETAILED: RIGHT INFERIOR FOREHEAD
LOCATION DETAILED: LEFT INFERIOR MEDIAL FOREHEAD

## 2025-04-29 ASSESSMENT — LOCATION ZONE DERM: LOCATION ZONE: FACE

## 2025-04-29 NOTE — PROCEDURE: BOTOX (U OR CC)
Nasal Root Units/Cc: 0
Including Pricing Information In The Note: No
Lot #: [id number]
Dilution (U/0.1 Cc): 4
Forehead Units/Cc: 16
Glabellar Complex Units: 9
Consent: Written consent obtained. Risks include but not limited to lid/brow ptosis, bruising, swelling, diplopia, temporary effect, incomplete chemical denervation.
Post-Care Instructions: Patient instructed to not lie down for 4 hours and limit physical activity for 24 hours.
Expiration Date (Month Year): 02/2027
Price (Use Numbers Only, No Special Characters Or $): 400
Detail Level: Detailed
Document As Units Or Cc?: units
Quantity Per Injection Site (Units Or Cc): 3 U
Quantity Per Injection Site (Units Or Cc): 2 U
Quantity Per Injection Site (Units Or Cc): 1 U

## 2025-05-12 ENCOUNTER — HOSPITAL ENCOUNTER (OUTPATIENT)
Dept: CT IMAGING | Facility: HOSPITAL | Age: 75
Discharge: HOME | End: 2025-05-12
Attending: INTERNAL MEDICINE
Payer: MEDICARE

## 2025-05-12 DIAGNOSIS — D50.8 OTHER IRON DEFICIENCY ANEMIAS: ICD-10-CM

## 2025-05-12 PROCEDURE — 74261 CT COLONOGRAPHY DX: CPT

## 2025-08-19 ENCOUNTER — APPOINTMENT (OUTPATIENT)
Dept: URBAN - METROPOLITAN AREA CLINIC 374 | Facility: CLINIC | Age: 75
Setting detail: DERMATOLOGY
End: 2025-08-19

## 2025-08-19 DIAGNOSIS — Z41.9 ENCOUNTER FOR PROCEDURE FOR PURPOSES OTHER THAN REMEDYING HEALTH STATE, UNSPECIFIED: ICD-10-CM

## 2025-08-19 PROCEDURE — ? BOTOX (U OR CC)

## 2025-08-19 ASSESSMENT — LOCATION DETAILED DESCRIPTION DERM
LOCATION DETAILED: RIGHT SUPERIOR FOREHEAD
LOCATION DETAILED: LEFT SUPERIOR FOREHEAD
LOCATION DETAILED: LEFT FOREHEAD
LOCATION DETAILED: RIGHT INFERIOR FOREHEAD
LOCATION DETAILED: LEFT LATERAL FOREHEAD
LOCATION DETAILED: GLABELLA
LOCATION DETAILED: RIGHT FOREHEAD
LOCATION DETAILED: LEFT INFERIOR MEDIAL FOREHEAD
LOCATION DETAILED: LEFT SUPERIOR MEDIAL FOREHEAD
LOCATION DETAILED: SUPERIOR MID FOREHEAD

## 2025-08-19 ASSESSMENT — LOCATION SIMPLE DESCRIPTION DERM
LOCATION SIMPLE: RIGHT FOREHEAD
LOCATION SIMPLE: LEFT FOREHEAD
LOCATION SIMPLE: GLABELLA
LOCATION SIMPLE: SUPERIOR FOREHEAD

## 2025-08-19 ASSESSMENT — LOCATION ZONE DERM: LOCATION ZONE: FACE
